# Patient Record
Sex: MALE | Race: WHITE
[De-identification: names, ages, dates, MRNs, and addresses within clinical notes are randomized per-mention and may not be internally consistent; named-entity substitution may affect disease eponyms.]

---

## 2019-08-16 ENCOUNTER — HOSPITAL ENCOUNTER (EMERGENCY)
Dept: HOSPITAL 47 - EC | Age: 64
Discharge: HOME | End: 2019-08-16
Payer: COMMERCIAL

## 2019-08-16 VITALS
TEMPERATURE: 98.4 F | HEART RATE: 60 BPM | DIASTOLIC BLOOD PRESSURE: 90 MMHG | RESPIRATION RATE: 18 BRPM | SYSTOLIC BLOOD PRESSURE: 148 MMHG
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
## 2019-08-16 DIAGNOSIS — Z23: ICD-10-CM

## 2019-08-16 DIAGNOSIS — S60.455A: Primary | ICD-10-CM

## 2019-08-16 PROCEDURE — 90715 TDAP VACCINE 7 YRS/> IM: CPT

## 2019-08-16 PROCEDURE — 10120 INC&RMVL FB SUBQ TISS SMPL: CPT

## 2019-08-16 PROCEDURE — 90471 IMMUNIZATION ADMIN: CPT

## 2019-08-16 PROCEDURE — 99283 EMERGENCY DEPT VISIT LOW MDM: CPT

## 2019-08-16 NOTE — ED
General Adult HPI





- General


Chief complaint: Skin/Abscess/Foreign Body


Stated complaint: sliver left hand


Time Seen by Provider: 08/16/19 13:05


Source: patient, RN notes reviewed


Mode of arrival: ambulatory


Limitations: no limitations





- History of Present Illness


Initial comments: 





Patient 63-year-old male presented to the emergency room today with a chief 

complaint of splinter underneath the left ring finger.  He does admit that he 

was tying off his boat dock piece of wood came off and went under his nail bed. 

Patient states is unsure of his tetanus status.  He does admit he was unable to 

retrieve splinter at home.  Denies any signs or symptoms.





- Related Data


                                  Previous Rx's











 Medication  Instructions  Recorded


 


Cephalexin [Keflex] 500 mg PO Q12HR 10 Days  cap 08/16/19











                                    Allergies











Allergy/AdvReac Type Severity Reaction Status Date / Time


 


No Known Allergies Allergy   Verified 08/16/19 13:03














Review of Systems


ROS Statement: 


Those systems with pertinent positive or pertinent negative responses have been 

documented in the HPI.





ROS Other: All systems not noted in ROS Statement are negative.





Past Medical History


Past Medical History: No Reported History


History of Any Multi-Drug Resistant Organisms: None Reported


Past Surgical History: No Surgical Hx Reported


Past Psychological History: No Psychological Hx Reported


Smoking Status: Never smoker


Past Alcohol Use History: Occasional


Past Drug Use History: None Reported





General Exam





- General Exam Comments


Initial Comments: 





General:  The patient is awake and alert, in no distress, and does not appear 

acutely ill.   


Musculoskeletal: Full range motion.  Sensation intact.  Strength 5/5.


Neurological:  A&O x 3. CN II-XII intact, There are no obvious motor or sensory 

deficits. Coordination appears grossly intact. Speech is normal.


Skin:  Patient does have a splinter underneath the nailbed of the left ring 

finger.


Psychiatric:  Normal mood and affect.  


Limitations: no limitations





Course


                                   Vital Signs











  08/16/19





  13:00


 


Temperature 98.4 F


 


Pulse Rate 60


 


Respiratory 18





Rate 


 


Blood Pressure 148/90


 


O2 Sat by Pulse 98





Oximetry 














Procedures





- Procedures


Initial comment: 





Patient's left ring finger was anesthetized at the metacarpals 1% lidocaine.  

Area was cleaned with Betadine.  A small cut was made at the tip of the nail.  

This did allow for hemostat to be pushed in to grab the splinter and remove 

intact.  Patient tolerated procedure well.





Medical Decision Making





- Medical Decision Making





Patient tetanus updated in the emergency room.  Patient splinter was removed.  

Patient will be started on antibiotics to cover for infection.  Is advised to 

watch for signs of infection return for any other concerns.





Disposition


Clinical Impression: 


 Splinter of finger





Disposition: HOME SELF-CARE


Condition: Good


Additional Instructions: 


Please watch for signs of infection which may include increased pain, swelling, 

redness, fever or chills.  Return to emergency room for any signs of infection 

other concerns.  Use antibiotic as prescribed.


Prescriptions: 


Cephalexin [Keflex] 500 mg PO Q12HR 10 Days  cap


Is patient prescribed a controlled substance at d/c from ED?: No


Referrals: 


None,Stated [Primary Care Provider] - 1-2 days


Time of Disposition: 14:23